# Patient Record
Sex: FEMALE
[De-identification: names, ages, dates, MRNs, and addresses within clinical notes are randomized per-mention and may not be internally consistent; named-entity substitution may affect disease eponyms.]

---

## 2023-09-24 ENCOUNTER — NURSE TRIAGE (OUTPATIENT)
Dept: OTHER | Facility: CLINIC | Age: 28
End: 2023-09-24

## 2023-09-25 ENCOUNTER — NURSE TRIAGE (OUTPATIENT)
Dept: OTHER | Facility: CLINIC | Age: 28
End: 2023-09-25

## 2023-09-25 NOTE — TELEPHONE ENCOUNTER
Received call from Dustin Wolff at [facility] Name: Paty Morales MRN: [id number]    Subjective: Caller states \"I had cramping starting on Saturday, 23. The cramping lasted all day on , and then the pains were about 5 minutes apart, and now 2-4 min apart and very painful. \"     Onset:  Saturday, 23\    Due Date: 10/3/23    Weeks of gestation: 38 weeks, 6 days    Fetal Movement: the patients states that the baby moves as usual    Active vaginal bleeding: none  # of pads/hour: 0    Vaginal fluid/discharge:  none    Contractions: since 211, they have been 2 to 4 minutes apart  and the pains are moderately severe    Sexual intercourse in last 2 days: NO     (# of pregnancies): 1   Para (# of live births): 0    Past complications with pregnancy/delivery: n/a    Triage indicates for patient to  go to L&D    Care advice provided, patient verbalizes understanding; denies any other questions or concerns; instructed to call back for any new or worsening symptoms.     To L&D at [facility] ([place])      This triage is a result of a call to the [address]    Reason for Disposition   [1] First baby (primipara) AND [2] contractions < 6 minutes apart  AND [3] present 2 hours    Protocols used: Pregnancy - Labor-ADULT-AH